# Patient Record
Sex: MALE | Race: OTHER | HISPANIC OR LATINO | Employment: FULL TIME | URBAN - METROPOLITAN AREA
[De-identification: names, ages, dates, MRNs, and addresses within clinical notes are randomized per-mention and may not be internally consistent; named-entity substitution may affect disease eponyms.]

---
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
## 2020-06-09 ENCOUNTER — HOSPITAL ENCOUNTER (EMERGENCY)
Facility: HOSPITAL | Age: 20
Discharge: HOME | End: 2020-06-09

## 2020-06-09 VITALS
WEIGHT: 125 LBS | TEMPERATURE: 98 F | HEART RATE: 86 BPM | SYSTOLIC BLOOD PRESSURE: 107 MMHG | OXYGEN SATURATION: 99 % | RESPIRATION RATE: 18 BRPM | DIASTOLIC BLOOD PRESSURE: 60 MMHG | BODY MASS INDEX: 19.58 KG/M2

## 2020-06-09 DIAGNOSIS — H61.21 IMPACTED CERUMEN OF RIGHT EAR: Primary | ICD-10-CM

## 2020-06-09 PROCEDURE — 99282 EMERGENCY DEPT VISIT SF MDM: CPT

## 2020-06-09 PROCEDURE — 99281 EMR DPT VST MAYX REQ PHY/QHP: CPT | Performed by: EMERGENCY MEDICINE

## 2020-08-06 ENCOUNTER — HOSPITAL ENCOUNTER (EMERGENCY)
Facility: HOSPITAL | Age: 20
Discharge: HOME | End: 2020-08-06
Attending: EMERGENCY MEDICINE | Admitting: EMERGENCY MEDICINE

## 2020-08-06 VITALS
SYSTOLIC BLOOD PRESSURE: 139 MMHG | HEART RATE: 95 BPM | OXYGEN SATURATION: 96 % | BODY MASS INDEX: 21.93 KG/M2 | RESPIRATION RATE: 18 BRPM | TEMPERATURE: 98.5 F | WEIGHT: 140 LBS | DIASTOLIC BLOOD PRESSURE: 65 MMHG

## 2020-08-06 DIAGNOSIS — R11.0 NAUSEA: Primary | ICD-10-CM

## 2020-08-06 DIAGNOSIS — R10.9 ABDOMINAL PAIN: ICD-10-CM

## 2020-08-06 PROCEDURE — 99284 EMERGENCY DEPT VISIT MOD MDM: CPT | Performed by: EMERGENCY MEDICINE

## 2020-08-06 PROCEDURE — 99283 EMERGENCY DEPT VISIT LOW MDM: CPT

## 2020-08-06 RX ORDER — FAMOTIDINE 20 MG/1
20 TABLET, FILM COATED ORAL DAILY
Qty: 30 TABLET | Refills: 0 | Status: SHIPPED | OUTPATIENT
Start: 2020-08-06

## 2020-08-06 NOTE — ED ATTENDING ATTESTATION
8/6/2020  Russ DAVIDSON MD, saw and evaluated the patient  I have discussed the patient with the resident/non-physician practitioner and agree with the resident's/non-physician practitioner's findings, Plan of Care, and MDM as documented in the resident's/non-physician practitioner's note, except where noted  All available labs and Radiology studies were reviewed  I was present for key portions of any procedure(s) performed by the resident/non-physician practitioner and I was immediately available to provide assistance  At this point I agree with the current assessment done in the Emergency Department  I have conducted an independent evaluation of this patient a history and physical is as follows:    22-year-old man presenting with 2 complaints  First, patient has longstanding history of headaches and dizziness but states these have gotten worse since starting a new job where he is working in a hot environment  Requesting work note  No associated fever, neck pain or stiffness, weakness, numbness or tingling in extremities, visual changes  Patient also notes over the last few days he has developed nausea and sometimes vomiting when eating meals, particularly fatty meals  Has had some epigastric discomfort intermittently  Awake and alert no acute distress  Head atraumatic normocephalic  Neck is supple  Heart regular rate rhythm, no murmurs rubs or gallops  Lungs clear to auscultation bilaterally  Abdomen soft, nondistended, minimal epigastric tenderness with no rebound or guarding  Patient does not want to stay in the emergency department for any testing  Will give work note  Given possibility of symptomatic cholelithiasis will refer to surgery clinic in case the patient wants to follow-up  I also told him he is welcome to come back to the emergency department for further evaluation      ED Course         Critical Care Time  Procedures

## 2020-08-06 NOTE — ED PROVIDER NOTES
History  Chief Complaint   Patient presents with    Vomiting     Pt reports vomiting and getting headaches while at work- just started at a new job where it is very hot and states that is when he gets symptoms    Headache     Otherwise healthy 56-year-old male presents for evaluation nausea, vomiting, abdominal pain  Patient states he left work yesterday due to nauseous  Patient states over the last 3 days whenever he eats meals he becomes nauseous although denies vomiting episodes  Patient notes increased heartburn over this time frame as well  Patient has observed that fatty meals increase his symptoms compared to blander foods  Patient has become satsiated earlier over this time frame  Patient has a dull epigastric abdominal pain associated with this  Patient notes intermittent episodes of diarrhea that are nonbloody, small in volume  He associates this with eating  Patient notes last week he had a family member visit who had a gastroenteritis illness  Patient has been taking over-the-counter pain medication with mild relief in his abdominal discomfort  Patient states he has been getting lightheaded at work over this time frame, 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  Patient states he tries to stay hydrated during shift  He a denies syncope or falls due to this  He denies chest pains or palpitations, cardiac history in himself or his family  He denies fevers, chills, myalgias, sick contacts  None       History reviewed  No pertinent past medical history  History reviewed  No pertinent surgical history  History reviewed  No pertinent family history  I have reviewed and agree with the history as documented      E-Cigarette/Vaping    E-Cigarette Use Never User      E-Cigarette/Vaping Substances    Nicotine No     THC No     CBD No     Flavoring No     Other No     Unknown No      Social History     Tobacco Use    Smoking status: Current Some Day Smoker     Packs/day: 0 50  Smokeless tobacco: Never Used   Substance Use Topics    Alcohol use: No     Comment: social    Drug use: Yes     Frequency: 3 0 times per week     Types: Marijuana        Review of Systems   Constitutional: Negative for appetite change, chills and fever  Eyes: Negative for visual disturbance  Respiratory: Negative for shortness of breath  Cardiovascular: Negative for chest pain  Gastrointestinal: Positive for abdominal pain, diarrhea and nausea  Negative for blood in stool, constipation and vomiting  Genitourinary: Negative for dysuria  Musculoskeletal: Negative for back pain and neck pain  Neurological: Positive for light-headedness  Negative for syncope and weakness  All other systems reviewed and are negative  Physical Exam  ED Triage Vitals [08/06/20 1447]   Temperature Pulse Respirations Blood Pressure SpO2   98 5 °F (36 9 °C) 95 18 139/65 96 %      Temp Source Heart Rate Source Patient Position - Orthostatic VS BP Location FiO2 (%)   Oral Monitor -- -- --      Pain Score       --             Orthostatic Vital Signs  Vitals:    08/06/20 1447   BP: 139/65   Pulse: 95       Physical Exam  Vitals signs reviewed  Constitutional:       General: He is not in acute distress  Appearance: Normal appearance  He is normal weight  He is not ill-appearing, toxic-appearing or diaphoretic  HENT:      Head: Normocephalic and atraumatic  Eyes:      General: No scleral icterus  Right eye: No discharge  Left eye: No discharge  Cardiovascular:      Rate and Rhythm: Normal rate and regular rhythm  Pulmonary:      Effort: Pulmonary effort is normal  No respiratory distress  Breath sounds: Normal breath sounds  Abdominal:      General: Abdomen is flat  There is no distension  Palpations: Abdomen is soft  Tenderness: There is abdominal tenderness (mild tenderness to epigastric area)  There is no right CVA tenderness, left CVA tenderness or guarding  Musculoskeletal:         General: No deformity or signs of injury  Skin:     General: Skin is warm  Coloration: Skin is not jaundiced or pale  Neurological:      General: No focal deficit present  Mental Status: He is alert and oriented to person, place, and time  Motor: No weakness  Coordination: Coordination normal       Gait: Gait normal          ED Medications  Medications - No data to display    Diagnostic Studies  Results Reviewed     None                 No orders to display         Procedures  Procedures      ED Course       US AUDIT      Most Recent Value   Initial Alcohol Screen: US AUDIT-C    1  How often do you have a drink containing alcohol?  0 Filed at: 08/06/2020 1451   2  How many drinks containing alcohol do you have on a typical day you are drinking? 0 Filed at: 08/06/2020 1451   3a  Male UNDER 65: How often do you have five or more drinks on one occasion? 0 Filed at: 08/06/2020 1451   3b  FEMALE Any Age, or MALE 65+: How often do you have 4 or more drinks on one occassion? 0 Filed at: 08/06/2020 1449   Audit-C Score  0 Filed at: 08/06/2020 1451                  DEL/DAST-10      Most Recent Value   How many times in the past year have you    Used an illegal drug or used a prescription medication for non-medical reasons? Never Filed at: 08/06/2020 1449                              MDM  Number of Diagnoses or Management Options  Abdominal pain:   Nausea:   Diagnosis management comments: 70-year-old male presents for epigastric abdominal pain associated with episodes of nausea and vomiting  As patient notes that the symptoms are associated with food particularly fatty foods  Patient is also felt sensation of lightheadedness while at work, he does admit to starting any job and working in a hot facility  On exam patient is in no acute distress, he does have mild epigastric tenderness to palpation    Upon further discussion with patient patient is requesting to leave as he borrowed a car to get to the emergency department  Seeing as patient is in no acute distress, vital signs are normal patient can be discharged  Patient was provided follow-up for surgery as is possible he has coli lithiasis causing the symptoms and may need further imaging  Patient also may be suffering from gastritis or possibly ulcer, is provided prescription for Pepcid  Disposition  Final diagnoses:   Nausea   Abdominal pain     Time reflects when diagnosis was documented in both MDM as applicable and the Disposition within this note     Time User Action Codes Description Comment    8/6/2020  3:20 PM Carlitos Grullon Add [R11 0] Nausea     8/6/2020  3:20 PM Tadeo Nest [R10 9] Abdominal pain       ED Disposition     ED Disposition Condition Date/Time Comment    Discharge Stable Thu Aug 6, 2020  3:14 PM Scarlett Brunner discharge to home/self care  Follow-up Information     Follow up With Specialties Details Why Contact Info Additional Information    Infolink  Call  Use this number to help establish a primary care physician  [address] General Surgery Call  Schedule an appointment for follow-up of your abdominal discomfort  [address] [place] [address] [facility], [address] [place] [address], [id number]          Discharge Medication List as of 8/6/2020  3:23 PM      START taking these medications    Details   famotidine (PEPCID) 20 mg tablet Take 1 tablet (20 mg total) by mouth daily, Starting Thu 8/6/2020, Print           No discharge procedures on file  PDMP Review     None           ED Provider  Attending physically available and evaluated Scarlett Brunner I managed the patient along with the ED Attending      Electronically Signed by         Alvarado Hills DO  08/07/20 5202

## 2020-08-06 NOTE — Clinical Note
Venita Jules was seen and treated in our emergency department on 8/6/2020  none    Diagnosis: Abdominal pain    Major Levers  may return to work on return date  He may return on 08/07/2020  If you have any questions or concerns, please don't hesitate to call        Coni Fabry, DO    ______________________________           _______________          _______________  Hospital Representative                              Date                                Time

## 2020-11-10 ENCOUNTER — OFFICE VISIT (OUTPATIENT)
Dept: INTERNAL MEDICINE CLINIC | Facility: CLINIC | Age: 20
End: 2020-11-10

## 2020-11-10 VITALS
WEIGHT: 132.8 LBS | DIASTOLIC BLOOD PRESSURE: 72 MMHG | HEART RATE: 99 BPM | TEMPERATURE: 97.7 F | HEIGHT: 68 IN | BODY MASS INDEX: 20.13 KG/M2 | OXYGEN SATURATION: 99 % | SYSTOLIC BLOOD PRESSURE: 112 MMHG

## 2020-11-10 DIAGNOSIS — Z00.00 ROUTINE GENERAL MEDICAL EXAMINATION AT A HEALTH CARE FACILITY: Primary | ICD-10-CM

## 2020-11-10 PROCEDURE — 99395 PREV VISIT EST AGE 18-39: CPT | Performed by: INTERNAL MEDICINE

## 2024-11-13 ENCOUNTER — HOSPITAL ENCOUNTER (EMERGENCY)
Facility: HOSPITAL | Age: 24
Discharge: HOME | End: 2024-11-13
Attending: EMERGENCY MEDICINE

## 2024-11-13 VITALS
SYSTOLIC BLOOD PRESSURE: 137 MMHG | BODY MASS INDEX: 22.68 KG/M2 | WEIGHT: 144.5 LBS | HEART RATE: 101 BPM | TEMPERATURE: 98.6 F | DIASTOLIC BLOOD PRESSURE: 77 MMHG | OXYGEN SATURATION: 99 % | RESPIRATION RATE: 18 BRPM | HEIGHT: 67 IN

## 2024-11-13 DIAGNOSIS — R11.2 NAUSEA AND VOMITING: Primary | ICD-10-CM

## 2024-11-13 DIAGNOSIS — R53.83 FATIGUE: ICD-10-CM

## 2024-11-13 PROCEDURE — 99284 EMERGENCY DEPT VISIT MOD MDM: CPT | Performed by: EMERGENCY MEDICINE

## 2024-11-13 PROCEDURE — 99283 EMERGENCY DEPT VISIT LOW MDM: CPT

## 2024-11-13 RX ORDER — ONDANSETRON 4 MG/1
4 TABLET, ORALLY DISINTEGRATING ORAL EVERY 8 HOURS PRN
Qty: 20 TABLET | Refills: 0 | Status: SHIPPED | OUTPATIENT
Start: 2024-11-13

## 2024-11-13 RX ORDER — ONDANSETRON 4 MG/1
4 TABLET, ORALLY DISINTEGRATING ORAL ONCE
Status: COMPLETED | OUTPATIENT
Start: 2024-11-13 | End: 2024-11-13

## 2024-11-13 RX ADMIN — ONDANSETRON 4 MG: 4 TABLET, ORALLY DISINTEGRATING ORAL at 22:11

## 2024-11-14 NOTE — ED PROVIDER NOTES
Time reflects when diagnosis was documented in both MDM as applicable and the Disposition within this note       Time User Action Codes Description Comment    11/13/2024 10:03 PM Tavon Grimes Add [R11.2] Nausea and vomiting     11/13/2024 10:04 PM Tavon Grimes Add [R53.83] Fatigue           ED Disposition       ED Disposition   Discharge    Condition   Stable    Date/Time   Wed Nov 13, 2024 10:03 PM    Comment   Deandre Villalobos discharge to home/self care.                   Assessment & Plan       Medical Decision Making  24-year-old gentleman presents with request for a note for work.  He states that he has been feeling sick over the past couple days with nausea and vomiting.  He reports he is working 2 jobs and rarely eats during the day when he does it is junk food.  The patient states that he rarely gets enough sleep as well.  He had to leave work today and would like to be off tomorrow.  On exam, he is in no distress and appears well hydrated.  No indication for workup at this time.  Discussed the need to get appropriate sleep and to improve his stress levels and diet.  Provided zofran and a work note.  He is aware of reasons to return to the ED.    Risk  Prescription drug management.             Medications   ondansetron (ZOFRAN-ODT) dispersible tablet 4 mg (4 mg Oral Given 11/13/24 2211)       ED Risk Strat Scores                           SBIRT 20yo+      Flowsheet Row Most Recent Value   Initial Alcohol Screen: US AUDIT-C     1. How often do you have a drink containing alcohol? 0 Filed at: 11/13/2024 2159   2. How many drinks containing alcohol do you have on a typical day you are drinking?  0 Filed at: 11/13/2024 2159   3a. Male UNDER 65: How often do you have five or more drinks on one occasion? 0 Filed at: 11/13/2024 2159   Audit-C Score 0 Filed at: 11/13/2024 2159   DEL: How many times in the past year have you...    Used an illegal drug or used a prescription medication for non-medical reasons? Never  Filed at: 11/13/2024 8341                            History of Present Illness       Chief Complaint   Patient presents with    Abdominal Pain     N/V/D for 2 days. Bilateral abd pain. Had appendix removed. Took Advil 5 hours ago. Needs work note.       History reviewed. No pertinent past medical history.   Past Surgical History:   Procedure Laterality Date    APPENDECTOMY  2016 4/2014 per [facility]      History reviewed. No pertinent family history.   Social History     Tobacco Use    Smoking status: Some Days     Current packs/day: 0.50     Types: Cigarettes    Smokeless tobacco: Never    Tobacco comments:     Current everyday smoker per [facility]   Vaping Use    Vaping status: Every Day    Substances: Nicotine, THC   Substance Use Topics    Alcohol use: No     Comment: social    Drug use: Yes     Frequency: 3.0 times per week     Types: Marijuana      E-Cigarette/Vaping    E-Cigarette Use Current Every Day User       E-Cigarette/Vaping Substances    Nicotine Yes     THC Yes     CBD No     Flavoring No     Other No     Unknown No       I have reviewed and agree with the history as documented.       Abdominal Pain  Pain location:  Generalized  Pain quality: aching    Pain radiates to:  Does not radiate  Pain severity:  Mild  Onset quality:  Gradual  Duration:  2 days  Timing:  Constant  Progression:  Waxing and waning  Chronicity:  Recurrent  Context comment:  Poor diet, lack of sleep, increased stress  Relieved by:  Nothing  Worsened by:  Nothing  Ineffective treatments:  None tried  Associated symptoms: anorexia, nausea and vomiting        Review of Systems   Gastrointestinal:  Positive for abdominal pain, anorexia, nausea and vomiting.   All other systems reviewed and are negative.          Objective       ED Triage Vitals [11/13/24 2157]   Temperature Pulse Blood Pressure Respirations SpO2 Patient Position - Orthostatic VS   98.6 °F (37 °C) 101 137/77 18 99 % Sitting      Temp Source Heart Rate Source BP  Location FiO2 (%) Pain Score    Oral Monitor Left arm -- 3      Vitals      Date and Time Temp Pulse SpO2 Resp BP Pain Score FACES Pain Rating User   11/13/24 2157 98.6 °F (37 °C) 101 99 % 18 137/77 3 -- ST            Physical Exam  Vitals and nursing note reviewed.   Constitutional:       General: He is not in acute distress.     Appearance: Normal appearance. He is well-developed. He is not ill-appearing, toxic-appearing or diaphoretic.   HENT:      Head: Normocephalic and atraumatic.      Right Ear: External ear normal.      Left Ear: External ear normal.      Nose: Nose normal.      Mouth/Throat:      Mouth: Mucous membranes are moist.      Pharynx: Oropharynx is clear.   Eyes:      Conjunctiva/sclera: Conjunctivae normal.      Pupils: Pupils are equal, round, and reactive to light.   Cardiovascular:      Rate and Rhythm: Normal rate and regular rhythm.      Heart sounds: Normal heart sounds.   Pulmonary:      Effort: Pulmonary effort is normal. No respiratory distress.      Breath sounds: Normal breath sounds.   Abdominal:      General: Bowel sounds are normal. There is no distension.      Palpations: Abdomen is soft. There is no mass.      Tenderness: There is abdominal tenderness (mild, with aggressive palpation). There is no guarding or rebound.   Musculoskeletal:         General: Normal range of motion.      Cervical back: Neck supple. No rigidity.      Right lower leg: No edema.      Left lower leg: No edema.   Skin:     General: Skin is warm and dry.      Capillary Refill: Capillary refill takes less than 2 seconds.   Neurological:      General: No focal deficit present.      Mental Status: He is alert and oriented to person, place, and time.   Psychiatric:         Mood and Affect: Mood normal.         Behavior: Behavior normal.         Results Reviewed       None            No orders to display       Procedures    ED Medication and Procedure Management   Prior to Admission Medications   Prescriptions  Last Dose Informant Patient Reported? Taking?   famotidine (PEPCID) 20 mg tablet   No No   Sig: Take 1 tablet (20 mg total) by mouth daily   Patient not taking: Reported on 11/10/2020      Facility-Administered Medications: None     Discharge Medication List as of 11/13/2024 10:05 PM        START taking these medications    Details   ondansetron (ZOFRAN-ODT) 4 mg disintegrating tablet Take 1 tablet (4 mg total) by mouth every 8 (eight) hours as needed for nausea or vomiting, Starting Wed 11/13/2024, Normal           CONTINUE these medications which have NOT CHANGED    Details   famotidine (PEPCID) 20 mg tablet Take 1 tablet (20 mg total) by mouth daily, Starting Thu 8/6/2020, Print           No discharge procedures on file.  ED SEPSIS DOCUMENTATION   Time reflects when diagnosis was documented in both MDM as applicable and the Disposition within this note       Time User Action Codes Description Comment    11/13/2024 10:03 PM Tavon Grimes [R11.2] Nausea and vomiting     11/13/2024 10:04 PM Tavon Grimes [R53.83] Fatigue                  Tavon Grimes DO  11/13/24 2217